# Patient Record
Sex: MALE | Race: OTHER | HISPANIC OR LATINO | Employment: UNEMPLOYED | URBAN - METROPOLITAN AREA
[De-identification: names, ages, dates, MRNs, and addresses within clinical notes are randomized per-mention and may not be internally consistent; named-entity substitution may affect disease eponyms.]

---

## 2017-01-03 ENCOUNTER — HOSPITAL ENCOUNTER (OUTPATIENT)
Dept: RADIOLOGY | Facility: HOSPITAL | Age: 52
Discharge: HOME | End: 2017-01-03
Payer: COMMERCIAL

## 2017-01-03 DIAGNOSIS — E78.00 PURE HYPERCHOLESTEROLEMIA: ICD-10-CM

## 2017-07-06 ENCOUNTER — HOSPITAL ENCOUNTER (EMERGENCY)
Facility: HOSPITAL | Age: 52
Discharge: HOME | End: 2017-07-06
Admitting: EMERGENCY MEDICINE
Payer: COMMERCIAL

## 2017-07-06 ENCOUNTER — APPOINTMENT (EMERGENCY)
Dept: CT IMAGING | Facility: HOSPITAL | Age: 52
End: 2017-07-06
Payer: COMMERCIAL

## 2017-07-06 VITALS
WEIGHT: 315 LBS | RESPIRATION RATE: 30 BRPM | TEMPERATURE: 99.1 F | SYSTOLIC BLOOD PRESSURE: 175 MMHG | DIASTOLIC BLOOD PRESSURE: 69 MMHG | OXYGEN SATURATION: 95 % | BODY MASS INDEX: 75.53 KG/M2 | HEART RATE: 80 BPM

## 2017-07-06 DIAGNOSIS — R91.1 PULMONARY NODULE: ICD-10-CM

## 2017-07-06 DIAGNOSIS — J02.9 ACUTE PHARYNGITIS: Primary | ICD-10-CM

## 2017-07-06 LAB
ALBUMIN SERPL BCP-MCNC: 3.4 G/DL (ref 3.5–5)
ALP SERPL-CCNC: 158 U/L (ref 46–116)
ALT SERPL W P-5'-P-CCNC: 128 U/L (ref 12–78)
ANION GAP SERPL CALCULATED.3IONS-SCNC: 12 MMOL/L (ref 4–13)
AST SERPL W P-5'-P-CCNC: 45 U/L (ref 5–45)
BASOPHILS # BLD AUTO: 0.05 THOUSANDS/ΜL (ref 0–0.1)
BASOPHILS NFR BLD AUTO: 0 % (ref 0–1)
BILIRUB SERPL-MCNC: 0.4 MG/DL (ref 0.2–1)
BUN SERPL-MCNC: 13 MG/DL (ref 5–25)
CALCIUM SERPL-MCNC: 10 MG/DL (ref 8.3–10.1)
CHLORIDE SERPL-SCNC: 97 MMOL/L (ref 100–108)
CO2 SERPL-SCNC: 24 MMOL/L (ref 21–32)
CREAT SERPL-MCNC: 1.15 MG/DL (ref 0.6–1.3)
DEPRECATED D DIMER PPP: <270 NG/ML (FEU) (ref 0–424)
EOSINOPHIL # BLD AUTO: 0.39 THOUSAND/ΜL (ref 0–0.61)
EOSINOPHIL NFR BLD AUTO: 4 % (ref 0–6)
ERYTHROCYTE [DISTWIDTH] IN BLOOD BY AUTOMATED COUNT: 14.9 % (ref 11.6–15.1)
GFR SERPL CREATININE-BSD FRML MDRD: >60 ML/MIN/1.73SQ M
GLUCOSE SERPL-MCNC: 361 MG/DL (ref 65–140)
HCT VFR BLD AUTO: 42.4 % (ref 36.5–49.3)
HGB BLD-MCNC: 14.3 G/DL (ref 12–17)
LYMPHOCYTES # BLD AUTO: 2.06 THOUSANDS/ΜL (ref 0.6–4.47)
LYMPHOCYTES NFR BLD AUTO: 18 % (ref 14–44)
MCH RBC QN AUTO: 30.4 PG (ref 26.8–34.3)
MCHC RBC AUTO-ENTMCNC: 33.7 G/DL (ref 31.4–37.4)
MCV RBC AUTO: 90 FL (ref 82–98)
MONOCYTES # BLD AUTO: 0.62 THOUSAND/ΜL (ref 0.17–1.22)
MONOCYTES NFR BLD AUTO: 6 % (ref 4–12)
NEUTROPHILS # BLD AUTO: 8.13 THOUSANDS/ΜL (ref 1.85–7.62)
NEUTS SEG NFR BLD AUTO: 72 % (ref 43–75)
PLATELET # BLD AUTO: 267 THOUSANDS/UL (ref 149–390)
PMV BLD AUTO: 10.3 FL (ref 8.9–12.7)
POTASSIUM SERPL-SCNC: 4.4 MMOL/L (ref 3.5–5.3)
PROT SERPL-MCNC: 7.9 G/DL (ref 6.4–8.2)
RBC # BLD AUTO: 4.71 MILLION/UL (ref 3.88–5.62)
S PYO AG THROAT QL: NEGATIVE
SODIUM SERPL-SCNC: 133 MMOL/L (ref 136–145)
WBC # BLD AUTO: 11.25 THOUSAND/UL (ref 4.31–10.16)

## 2017-07-06 PROCEDURE — 80053 COMPREHEN METABOLIC PANEL: CPT | Performed by: PHYSICIAN ASSISTANT

## 2017-07-06 PROCEDURE — 85379 FIBRIN DEGRADATION QUANT: CPT | Performed by: PHYSICIAN ASSISTANT

## 2017-07-06 PROCEDURE — 87147 CULTURE TYPE IMMUNOLOGIC: CPT | Performed by: PHYSICIAN ASSISTANT

## 2017-07-06 PROCEDURE — 87070 CULTURE OTHR SPECIMN AEROBIC: CPT | Performed by: PHYSICIAN ASSISTANT

## 2017-07-06 PROCEDURE — 99284 EMERGENCY DEPT VISIT MOD MDM: CPT

## 2017-07-06 PROCEDURE — 70491 CT SOFT TISSUE NECK W/DYE: CPT

## 2017-07-06 PROCEDURE — 87430 STREP A AG IA: CPT | Performed by: PHYSICIAN ASSISTANT

## 2017-07-06 PROCEDURE — 85025 COMPLETE CBC W/AUTO DIFF WBC: CPT | Performed by: PHYSICIAN ASSISTANT

## 2017-07-06 PROCEDURE — 36415 COLL VENOUS BLD VENIPUNCTURE: CPT | Performed by: PHYSICIAN ASSISTANT

## 2017-07-06 RX ADMIN — IOHEXOL 100 ML: 350 INJECTION, SOLUTION INTRAVENOUS at 16:14

## 2017-07-08 LAB — BACTERIA THROAT CULT: NORMAL

## 2017-07-14 ENCOUNTER — APPOINTMENT (EMERGENCY)
Dept: RADIOLOGY | Facility: HOSPITAL | Age: 52
End: 2017-07-14
Attending: EMERGENCY MEDICINE
Payer: COMMERCIAL

## 2017-07-14 ENCOUNTER — HOSPITAL ENCOUNTER (INPATIENT)
Facility: HOSPITAL | Age: 52
LOS: 3 days | Discharge: HOME | DRG: 113 | End: 2017-07-17
Attending: INTERNAL MEDICINE | Admitting: INTERNAL MEDICINE
Payer: COMMERCIAL

## 2017-07-14 ENCOUNTER — APPOINTMENT (EMERGENCY)
Dept: CT IMAGING | Facility: HOSPITAL | Age: 52
End: 2017-07-14
Payer: COMMERCIAL

## 2017-07-14 ENCOUNTER — HOSPITAL ENCOUNTER (EMERGENCY)
Facility: HOSPITAL | Age: 52
End: 2017-07-14
Attending: EMERGENCY MEDICINE
Payer: COMMERCIAL

## 2017-07-14 VITALS
RESPIRATION RATE: 25 BRPM | HEART RATE: 91 BPM | TEMPERATURE: 97.5 F | DIASTOLIC BLOOD PRESSURE: 65 MMHG | WEIGHT: 315 LBS | HEIGHT: 64 IN | SYSTOLIC BLOOD PRESSURE: 147 MMHG | OXYGEN SATURATION: 95 % | BODY MASS INDEX: 53.78 KG/M2

## 2017-07-14 DIAGNOSIS — E66.01 MORBID OBESITY (HCC): ICD-10-CM

## 2017-07-14 DIAGNOSIS — J36 PERITONSILLAR ABSCESS: Primary | ICD-10-CM

## 2017-07-14 PROBLEM — E87.1 HYPONATREMIA: Status: ACTIVE | Noted: 2017-07-14

## 2017-07-14 LAB
ANION GAP SERPL CALCULATED.3IONS-SCNC: 10 MMOL/L (ref 4–13)
BASOPHILS # BLD AUTO: 0.06 THOUSANDS/ΜL (ref 0–0.1)
BASOPHILS NFR BLD AUTO: 0 % (ref 0–1)
BUN SERPL-MCNC: 9 MG/DL (ref 5–25)
CALCIUM SERPL-MCNC: 9.7 MG/DL (ref 8.3–10.1)
CHLORIDE SERPL-SCNC: 96 MMOL/L (ref 100–108)
CO2 SERPL-SCNC: 24 MMOL/L (ref 21–32)
CREAT SERPL-MCNC: 1.1 MG/DL (ref 0.6–1.3)
EOSINOPHIL # BLD AUTO: 0.32 THOUSAND/ΜL (ref 0–0.61)
EOSINOPHIL NFR BLD AUTO: 2 % (ref 0–6)
ERYTHROCYTE [DISTWIDTH] IN BLOOD BY AUTOMATED COUNT: 14.9 % (ref 11.6–15.1)
GFR SERPL CREATININE-BSD FRML MDRD: >60 ML/MIN/1.73SQ M
GLUCOSE SERPL-MCNC: 255 MG/DL (ref 65–140)
GLUCOSE SERPL-MCNC: 276 MG/DL (ref 65–140)
HCT VFR BLD AUTO: 44.2 % (ref 36.5–49.3)
HGB BLD-MCNC: 15.2 G/DL (ref 12–17)
LYMPHOCYTES # BLD AUTO: 2.32 THOUSANDS/ΜL (ref 0.6–4.47)
LYMPHOCYTES NFR BLD AUTO: 15 % (ref 14–44)
MCH RBC QN AUTO: 30.3 PG (ref 26.8–34.3)
MCHC RBC AUTO-ENTMCNC: 34.4 G/DL (ref 31.4–37.4)
MCV RBC AUTO: 88 FL (ref 82–98)
MONOCYTES # BLD AUTO: 0.84 THOUSAND/ΜL (ref 0.17–1.22)
MONOCYTES NFR BLD AUTO: 5 % (ref 4–12)
NEUTROPHILS # BLD AUTO: 11.96 THOUSANDS/ΜL (ref 1.85–7.62)
NEUTS SEG NFR BLD AUTO: 78 % (ref 43–75)
NT-PROBNP SERPL-MCNC: <5 PG/ML
PLATELET # BLD AUTO: 288 THOUSANDS/UL (ref 149–390)
PMV BLD AUTO: 9.9 FL (ref 8.9–12.7)
POTASSIUM SERPL-SCNC: 4.1 MMOL/L (ref 3.5–5.3)
RBC # BLD AUTO: 5.02 MILLION/UL (ref 3.88–5.62)
SODIUM SERPL-SCNC: 130 MMOL/L (ref 136–145)
TROPONIN I SERPL-MCNC: <0.02 NG/ML
WBC # BLD AUTO: 15.5 THOUSAND/UL (ref 4.31–10.16)

## 2017-07-14 PROCEDURE — 96361 HYDRATE IV INFUSION ADD-ON: CPT

## 2017-07-14 PROCEDURE — 99285 EMERGENCY DEPT VISIT HI MDM: CPT

## 2017-07-14 PROCEDURE — 82948 REAGENT STRIP/BLOOD GLUCOSE: CPT

## 2017-07-14 PROCEDURE — 96365 THER/PROPH/DIAG IV INF INIT: CPT

## 2017-07-14 PROCEDURE — 80048 BASIC METABOLIC PNL TOTAL CA: CPT | Performed by: EMERGENCY MEDICINE

## 2017-07-14 PROCEDURE — 85025 COMPLETE CBC W/AUTO DIFF WBC: CPT | Performed by: EMERGENCY MEDICINE

## 2017-07-14 PROCEDURE — 93005 ELECTROCARDIOGRAM TRACING: CPT | Performed by: EMERGENCY MEDICINE

## 2017-07-14 PROCEDURE — 83880 ASSAY OF NATRIURETIC PEPTIDE: CPT | Performed by: EMERGENCY MEDICINE

## 2017-07-14 PROCEDURE — 36415 COLL VENOUS BLD VENIPUNCTURE: CPT | Performed by: EMERGENCY MEDICINE

## 2017-07-14 PROCEDURE — 71020 HB CHEST X-RAY 2VW FRONTAL&LATL: CPT

## 2017-07-14 PROCEDURE — 70491 CT SOFT TISSUE NECK W/DYE: CPT

## 2017-07-14 PROCEDURE — 84484 ASSAY OF TROPONIN QUANT: CPT | Performed by: EMERGENCY MEDICINE

## 2017-07-14 PROCEDURE — 96375 TX/PRO/DX INJ NEW DRUG ADDON: CPT

## 2017-07-14 RX ORDER — DOXAZOSIN MESYLATE 4 MG/1
4 TABLET ORAL
Status: DISCONTINUED | OUTPATIENT
Start: 2017-07-14 | End: 2017-07-17 | Stop reason: HOSPADM

## 2017-07-14 RX ORDER — MORPHINE SULFATE 10 MG/ML
6 INJECTION, SOLUTION INTRAMUSCULAR; INTRAVENOUS EVERY 4 HOURS PRN
Status: DISCONTINUED | OUTPATIENT
Start: 2017-07-14 | End: 2017-07-17 | Stop reason: HOSPADM

## 2017-07-14 RX ORDER — ASPIRIN 81 MG/1
81 TABLET ORAL DAILY
Status: DISCONTINUED | OUTPATIENT
Start: 2017-07-15 | End: 2017-07-17 | Stop reason: HOSPADM

## 2017-07-14 RX ORDER — BUDESONIDE AND FORMOTEROL FUMARATE DIHYDRATE 160; 4.5 UG/1; UG/1
2 AEROSOL RESPIRATORY (INHALATION) 2 TIMES DAILY
Status: DISCONTINUED | OUTPATIENT
Start: 2017-07-15 | End: 2017-07-17 | Stop reason: HOSPADM

## 2017-07-14 RX ORDER — LIDOCAINE 50 MG/G
1 PATCH TOPICAL EVERY 24 HOURS
Status: DISCONTINUED | OUTPATIENT
Start: 2017-07-14 | End: 2017-07-17 | Stop reason: HOSPADM

## 2017-07-14 RX ORDER — ALBUTEROL SULFATE 2.5 MG/3ML
2.5 SOLUTION RESPIRATORY (INHALATION) 3 TIMES DAILY
Status: DISCONTINUED | OUTPATIENT
Start: 2017-07-14 | End: 2017-07-17 | Stop reason: HOSPADM

## 2017-07-14 RX ORDER — ATENOLOL 50 MG/1
50 TABLET ORAL DAILY
Status: DISCONTINUED | OUTPATIENT
Start: 2017-07-15 | End: 2017-07-17 | Stop reason: HOSPADM

## 2017-07-14 RX ORDER — INSULIN GLARGINE 100 [IU]/ML
20 INJECTION, SOLUTION SUBCUTANEOUS DAILY
Status: DISCONTINUED | OUTPATIENT
Start: 2017-07-15 | End: 2017-07-16

## 2017-07-14 RX ORDER — ACETAMINOPHEN 325 MG/1
650 TABLET ORAL EVERY 4 HOURS PRN
Status: DISCONTINUED | OUTPATIENT
Start: 2017-07-14 | End: 2017-07-17 | Stop reason: HOSPADM

## 2017-07-14 RX ORDER — HALOPERIDOL 5 MG
10 TABLET ORAL DAILY
Status: DISCONTINUED | OUTPATIENT
Start: 2017-07-15 | End: 2017-07-17 | Stop reason: HOSPADM

## 2017-07-14 RX ORDER — CLINDAMYCIN PHOSPHATE 600 MG/50ML
600 INJECTION INTRAVENOUS ONCE
Status: COMPLETED | OUTPATIENT
Start: 2017-07-14 | End: 2017-07-14

## 2017-07-14 RX ORDER — BENZTROPINE MESYLATE 1 MG/1
1 TABLET ORAL DAILY
Status: DISCONTINUED | OUTPATIENT
Start: 2017-07-15 | End: 2017-07-17 | Stop reason: HOSPADM

## 2017-07-14 RX ORDER — FLUTICASONE PROPIONATE 50 MCG
1 SPRAY, SUSPENSION (ML) NASAL 2 TIMES DAILY
Status: DISCONTINUED | OUTPATIENT
Start: 2017-07-15 | End: 2017-07-17 | Stop reason: HOSPADM

## 2017-07-14 RX ORDER — FUROSEMIDE 40 MG/1
40 TABLET ORAL 2 TIMES DAILY
Status: DISCONTINUED | OUTPATIENT
Start: 2017-07-15 | End: 2017-07-17 | Stop reason: HOSPADM

## 2017-07-14 RX ORDER — CLINDAMYCIN PHOSPHATE 600 MG/50ML
600 INJECTION INTRAVENOUS EVERY 8 HOURS
Status: DISCONTINUED | OUTPATIENT
Start: 2017-07-14 | End: 2017-07-17 | Stop reason: HOSPADM

## 2017-07-14 RX ORDER — LOSARTAN POTASSIUM 50 MG/1
50 TABLET ORAL 2 TIMES DAILY
Status: DISCONTINUED | OUTPATIENT
Start: 2017-07-15 | End: 2017-07-17 | Stop reason: HOSPADM

## 2017-07-14 RX ORDER — PANTOPRAZOLE SODIUM 40 MG/1
40 TABLET, DELAYED RELEASE ORAL
Status: DISCONTINUED | OUTPATIENT
Start: 2017-07-15 | End: 2017-07-17 | Stop reason: HOSPADM

## 2017-07-14 RX ORDER — ONDANSETRON 2 MG/ML
4 INJECTION INTRAMUSCULAR; INTRAVENOUS EVERY 6 HOURS PRN
Status: DISCONTINUED | OUTPATIENT
Start: 2017-07-14 | End: 2017-07-17 | Stop reason: HOSPADM

## 2017-07-14 RX ORDER — CLINDAMYCIN PHOSPHATE 600 MG/50ML
600 INJECTION INTRAVENOUS ONCE
Status: DISCONTINUED | OUTPATIENT
Start: 2017-07-14 | End: 2017-07-14

## 2017-07-14 RX ORDER — ALPRAZOLAM 0.5 MG/1
1 TABLET ORAL DAILY
Status: DISCONTINUED | OUTPATIENT
Start: 2017-07-15 | End: 2017-07-17 | Stop reason: HOSPADM

## 2017-07-14 RX ORDER — ACETAMINOPHEN 325 MG/1
975 TABLET ORAL ONCE
Status: COMPLETED | OUTPATIENT
Start: 2017-07-14 | End: 2017-07-14

## 2017-07-14 RX ORDER — FLUOXETINE HYDROCHLORIDE 20 MG/1
40 CAPSULE ORAL DAILY
Status: DISCONTINUED | OUTPATIENT
Start: 2017-07-15 | End: 2017-07-17 | Stop reason: HOSPADM

## 2017-07-14 RX ORDER — DEXAMETHASONE SODIUM PHOSPHATE 4 MG/ML
4 INJECTION, SOLUTION INTRA-ARTICULAR; INTRALESIONAL; INTRAMUSCULAR; INTRAVENOUS; SOFT TISSUE EVERY 6 HOURS
Status: DISCONTINUED | OUTPATIENT
Start: 2017-07-14 | End: 2017-07-17 | Stop reason: HOSPADM

## 2017-07-14 RX ORDER — NICOTINE 21 MG/24HR
1 PATCH, TRANSDERMAL 24 HOURS TRANSDERMAL DAILY PRN
Status: DISCONTINUED | OUTPATIENT
Start: 2017-07-14 | End: 2017-07-17 | Stop reason: HOSPADM

## 2017-07-14 RX ORDER — OXYCODONE HYDROCHLORIDE AND ACETAMINOPHEN 5; 325 MG/1; MG/1
1 TABLET ORAL EVERY 4 HOURS PRN
Status: DISCONTINUED | OUTPATIENT
Start: 2017-07-14 | End: 2017-07-17 | Stop reason: HOSPADM

## 2017-07-14 RX ORDER — OXYCODONE HYDROCHLORIDE AND ACETAMINOPHEN 5; 325 MG/1; MG/1
2 TABLET ORAL EVERY 4 HOURS PRN
Status: DISCONTINUED | OUTPATIENT
Start: 2017-07-14 | End: 2017-07-17 | Stop reason: HOSPADM

## 2017-07-14 RX ADMIN — SODIUM CHLORIDE 1000 ML: 0.9 INJECTION, SOLUTION INTRAVENOUS at 09:17

## 2017-07-14 RX ADMIN — IOHEXOL 85 ML: 350 INJECTION, SOLUTION INTRAVENOUS at 10:12

## 2017-07-14 RX ADMIN — DEXAMETHASONE SODIUM PHOSPHATE 10 MG: 10 INJECTION, SOLUTION INTRAMUSCULAR; INTRAVENOUS at 09:20

## 2017-07-14 RX ADMIN — ACETAMINOPHEN 975 MG: 325 TABLET ORAL at 15:59

## 2017-07-14 RX ADMIN — CLINDAMYCIN PHOSPHATE 600 MG: 12 INJECTION, SOLUTION INTRAVENOUS at 10:53

## 2017-07-15 LAB
ALBUMIN SERPL BCP-MCNC: 3.3 G/DL (ref 3.5–5)
ALP SERPL-CCNC: 147 U/L (ref 46–116)
ALT SERPL W P-5'-P-CCNC: 74 U/L (ref 12–78)
ANION GAP SERPL CALCULATED.3IONS-SCNC: 10 MMOL/L (ref 4–13)
AST SERPL W P-5'-P-CCNC: 33 U/L (ref 5–45)
ATRIAL RATE: 94 BPM
BASOPHILS # BLD AUTO: 0.02 THOUSANDS/ΜL (ref 0–0.1)
BASOPHILS NFR BLD AUTO: 0 % (ref 0–1)
BILIRUB SERPL-MCNC: 0.9 MG/DL (ref 0.2–1)
BUN SERPL-MCNC: 13 MG/DL (ref 5–25)
CALCIUM SERPL-MCNC: 9.2 MG/DL (ref 8.3–10.1)
CHLORIDE SERPL-SCNC: 100 MMOL/L (ref 100–108)
CO2 SERPL-SCNC: 22 MMOL/L (ref 21–32)
CORTIS SERPL-MCNC: 0.6 UG/DL
CREAT SERPL-MCNC: 0.86 MG/DL (ref 0.6–1.3)
EOSINOPHIL # BLD AUTO: 0 THOUSAND/ΜL (ref 0–0.61)
EOSINOPHIL NFR BLD AUTO: 0 % (ref 0–6)
ERYTHROCYTE [DISTWIDTH] IN BLOOD BY AUTOMATED COUNT: 14.5 % (ref 11.6–15.1)
EST. AVERAGE GLUCOSE BLD GHB EST-MCNC: 183 MG/DL
GFR SERPL CREATININE-BSD FRML MDRD: >60 ML/MIN/1.73SQ M
GLUCOSE SERPL-MCNC: 282 MG/DL (ref 65–140)
GLUCOSE SERPL-MCNC: 295 MG/DL (ref 65–140)
GLUCOSE SERPL-MCNC: 300 MG/DL (ref 65–140)
GLUCOSE SERPL-MCNC: 380 MG/DL (ref 65–140)
GLUCOSE SERPL-MCNC: 393 MG/DL (ref 65–140)
HBA1C MFR BLD: 8 % (ref 4.2–6.3)
HCT VFR BLD AUTO: 40.5 % (ref 36.5–49.3)
HGB BLD-MCNC: 14.2 G/DL (ref 12–17)
LYMPHOCYTES # BLD AUTO: 1.71 THOUSANDS/ΜL (ref 0.6–4.47)
LYMPHOCYTES NFR BLD AUTO: 11 % (ref 14–44)
MCH RBC QN AUTO: 31.1 PG (ref 26.8–34.3)
MCHC RBC AUTO-ENTMCNC: 35.1 G/DL (ref 31.4–37.4)
MCV RBC AUTO: 89 FL (ref 82–98)
MONOCYTES # BLD AUTO: 0.41 THOUSAND/ΜL (ref 0.17–1.22)
MONOCYTES NFR BLD AUTO: 3 % (ref 4–12)
NEUTROPHILS # BLD AUTO: 13.48 THOUSANDS/ΜL (ref 1.85–7.62)
NEUTS SEG NFR BLD AUTO: 86 % (ref 43–75)
NRBC BLD AUTO-RTO: 0 /100 WBCS
OSMOLALITY UR/SERPL-RTO: 300 MMOL/KG (ref 282–298)
OSMOLALITY UR: 666 MMOL/KG
P AXIS: 59 DEGREES
PLATELET # BLD AUTO: 284 THOUSANDS/UL (ref 149–390)
PMV BLD AUTO: 10.4 FL (ref 8.9–12.7)
POTASSIUM SERPL-SCNC: 4.3 MMOL/L (ref 3.5–5.3)
PR INTERVAL: 144 MS
PROT SERPL-MCNC: 8.1 G/DL (ref 6.4–8.2)
QRS AXIS: 15 DEGREES
QRSD INTERVAL: 82 MS
QT INTERVAL: 320 MS
QTC INTERVAL: 400 MS
RBC # BLD AUTO: 4.57 MILLION/UL (ref 3.88–5.62)
SODIUM 24H UR-SCNC: 38 MOL/L
SODIUM SERPL-SCNC: 132 MMOL/L (ref 136–145)
T WAVE AXIS: 73 DEGREES
TSH SERPL DL<=0.05 MIU/L-ACNC: 0.36 UIU/ML (ref 0.36–3.74)
VENTRICULAR RATE: 94 BPM
WBC # BLD AUTO: 15.72 THOUSAND/UL (ref 4.31–10.16)

## 2017-07-15 PROCEDURE — 85025 COMPLETE CBC W/AUTO DIFF WBC: CPT | Performed by: INTERNAL MEDICINE

## 2017-07-15 PROCEDURE — 82533 TOTAL CORTISOL: CPT | Performed by: INTERNAL MEDICINE

## 2017-07-15 PROCEDURE — 94660 CPAP INITIATION&MGMT: CPT

## 2017-07-15 PROCEDURE — 83930 ASSAY OF BLOOD OSMOLALITY: CPT | Performed by: INTERNAL MEDICINE

## 2017-07-15 PROCEDURE — 80053 COMPREHEN METABOLIC PANEL: CPT | Performed by: INTERNAL MEDICINE

## 2017-07-15 PROCEDURE — 94640 AIRWAY INHALATION TREATMENT: CPT

## 2017-07-15 PROCEDURE — 84443 ASSAY THYROID STIM HORMONE: CPT | Performed by: INTERNAL MEDICINE

## 2017-07-15 PROCEDURE — 82948 REAGENT STRIP/BLOOD GLUCOSE: CPT

## 2017-07-15 PROCEDURE — 83036 HEMOGLOBIN GLYCOSYLATED A1C: CPT | Performed by: INTERNAL MEDICINE

## 2017-07-15 PROCEDURE — 83935 ASSAY OF URINE OSMOLALITY: CPT | Performed by: INTERNAL MEDICINE

## 2017-07-15 PROCEDURE — 84300 ASSAY OF URINE SODIUM: CPT | Performed by: INTERNAL MEDICINE

## 2017-07-15 PROCEDURE — 94760 N-INVAS EAR/PLS OXIMETRY 1: CPT

## 2017-07-15 RX ADMIN — OXYCODONE HYDROCHLORIDE AND ACETAMINOPHEN 2 TABLET: 5; 325 TABLET ORAL at 20:23

## 2017-07-15 RX ADMIN — INSULIN LISPRO 5 UNITS: 100 INJECTION, SOLUTION INTRAVENOUS; SUBCUTANEOUS at 22:10

## 2017-07-15 RX ADMIN — DEXAMETHASONE SODIUM PHOSPHATE 4 MG: 4 INJECTION, SOLUTION INTRAMUSCULAR; INTRAVENOUS at 22:10

## 2017-07-15 RX ADMIN — ALBUTEROL SULFATE 2.5 MG: 2.5 SOLUTION RESPIRATORY (INHALATION) at 20:30

## 2017-07-15 RX ADMIN — DOXAZOSIN 4 MG: 4 TABLET ORAL at 22:11

## 2017-07-15 RX ADMIN — LOSARTAN POTASSIUM 50 MG: 50 TABLET, FILM COATED ORAL at 16:59

## 2017-07-15 RX ADMIN — INSULIN LISPRO 15 UNITS: 100 INJECTION, SOLUTION INTRAVENOUS; SUBCUTANEOUS at 13:55

## 2017-07-15 RX ADMIN — INSULIN GLARGINE 20 UNITS: 100 INJECTION, SOLUTION SUBCUTANEOUS at 09:36

## 2017-07-15 RX ADMIN — INSULIN LISPRO 4 UNITS: 100 INJECTION, SOLUTION INTRAVENOUS; SUBCUTANEOUS at 00:06

## 2017-07-15 RX ADMIN — DOXAZOSIN 4 MG: 4 TABLET ORAL at 00:33

## 2017-07-15 RX ADMIN — OXYCODONE HYDROCHLORIDE AND ACETAMINOPHEN 2 TABLET: 5; 325 TABLET ORAL at 09:45

## 2017-07-15 RX ADMIN — FLUTICASONE PROPIONATE 1 SPRAY: 50 SPRAY, METERED NASAL at 17:01

## 2017-07-15 RX ADMIN — BUDESONIDE AND FORMOTEROL FUMARATE DIHYDRATE 2 PUFF: 160; 4.5 AEROSOL RESPIRATORY (INHALATION) at 09:49

## 2017-07-15 RX ADMIN — CLINDAMYCIN PHOSPHATE 600 MG: 12 INJECTION, SOLUTION INTRAMUSCULAR; INTRAVENOUS at 13:54

## 2017-07-15 RX ADMIN — FLUOXETINE 40 MG: 20 CAPSULE ORAL at 09:37

## 2017-07-15 RX ADMIN — DEXAMETHASONE SODIUM PHOSPHATE 4 MG: 4 INJECTION, SOLUTION INTRAMUSCULAR; INTRAVENOUS at 16:59

## 2017-07-15 RX ADMIN — BUDESONIDE AND FORMOTEROL FUMARATE DIHYDRATE 2 PUFF: 160; 4.5 AEROSOL RESPIRATORY (INHALATION) at 17:01

## 2017-07-15 RX ADMIN — ALBUTEROL SULFATE 2.5 MG: 2.5 SOLUTION RESPIRATORY (INHALATION) at 10:10

## 2017-07-15 RX ADMIN — CLINDAMYCIN PHOSPHATE 600 MG: 12 INJECTION, SOLUTION INTRAMUSCULAR; INTRAVENOUS at 00:05

## 2017-07-15 RX ADMIN — FUROSEMIDE 40 MG: 40 TABLET ORAL at 16:59

## 2017-07-15 RX ADMIN — OXYCODONE HYDROCHLORIDE AND ACETAMINOPHEN 2 TABLET: 5; 325 TABLET ORAL at 00:09

## 2017-07-15 RX ADMIN — ALBUTEROL SULFATE 2.5 MG: 2.5 SOLUTION RESPIRATORY (INHALATION) at 15:49

## 2017-07-15 RX ADMIN — INSULIN LISPRO 15 UNITS: 100 INJECTION, SOLUTION INTRAVENOUS; SUBCUTANEOUS at 17:30

## 2017-07-15 RX ADMIN — BENZTROPINE MESYLATE 1 MG: 1 TABLET ORAL at 09:39

## 2017-07-15 RX ADMIN — LOSARTAN POTASSIUM 50 MG: 50 TABLET, FILM COATED ORAL at 09:37

## 2017-07-15 RX ADMIN — DEXAMETHASONE SODIUM PHOSPHATE 4 MG: 4 INJECTION, SOLUTION INTRAMUSCULAR; INTRAVENOUS at 00:05

## 2017-07-15 RX ADMIN — ALPRAZOLAM 1 MG: 0.5 TABLET ORAL at 09:37

## 2017-07-15 RX ADMIN — DEXAMETHASONE SODIUM PHOSPHATE 4 MG: 4 INJECTION, SOLUTION INTRAMUSCULAR; INTRAVENOUS at 09:36

## 2017-07-15 RX ADMIN — INSULIN LISPRO 15 UNITS: 100 INJECTION, SOLUTION INTRAVENOUS; SUBCUTANEOUS at 09:39

## 2017-07-15 RX ADMIN — DEXAMETHASONE SODIUM PHOSPHATE 4 MG: 4 INJECTION, SOLUTION INTRAMUSCULAR; INTRAVENOUS at 05:53

## 2017-07-15 RX ADMIN — ATENOLOL 50 MG: 50 TABLET ORAL at 09:36

## 2017-07-15 RX ADMIN — CLINDAMYCIN PHOSPHATE 600 MG: 12 INJECTION, SOLUTION INTRAMUSCULAR; INTRAVENOUS at 05:57

## 2017-07-15 RX ADMIN — ASPIRIN 81 MG: 81 TABLET, COATED ORAL at 09:37

## 2017-07-15 RX ADMIN — PANTOPRAZOLE SODIUM 40 MG: 40 TABLET, DELAYED RELEASE ORAL at 16:59

## 2017-07-15 RX ADMIN — FUROSEMIDE 40 MG: 40 TABLET ORAL at 09:37

## 2017-07-15 RX ADMIN — INSULIN LISPRO 12 UNITS: 100 INJECTION, SOLUTION INTRAVENOUS; SUBCUTANEOUS at 09:39

## 2017-07-15 RX ADMIN — INSULIN LISPRO 16 UNITS: 100 INJECTION, SOLUTION INTRAVENOUS; SUBCUTANEOUS at 17:30

## 2017-07-15 RX ADMIN — CLINDAMYCIN PHOSPHATE 600 MG: 12 INJECTION, SOLUTION INTRAMUSCULAR; INTRAVENOUS at 22:11

## 2017-07-15 RX ADMIN — INSULIN LISPRO 12 UNITS: 100 INJECTION, SOLUTION INTRAVENOUS; SUBCUTANEOUS at 13:55

## 2017-07-15 RX ADMIN — PANTOPRAZOLE SODIUM 40 MG: 40 TABLET, DELAYED RELEASE ORAL at 05:53

## 2017-07-15 RX ADMIN — ENOXAPARIN SODIUM 40 MG: 40 INJECTION SUBCUTANEOUS at 09:36

## 2017-07-15 RX ADMIN — HALOPERIDOL 10 MG: 5 TABLET ORAL at 09:36

## 2017-07-15 RX ADMIN — FLUTICASONE PROPIONATE 1 SPRAY: 50 SPRAY, METERED NASAL at 09:39

## 2017-07-16 LAB
ANION GAP SERPL CALCULATED.3IONS-SCNC: 8 MMOL/L (ref 4–13)
BASOPHILS # BLD AUTO: 0.01 THOUSANDS/ΜL (ref 0–0.1)
BASOPHILS NFR BLD AUTO: 0 % (ref 0–1)
BUN SERPL-MCNC: 19 MG/DL (ref 5–25)
CALCIUM SERPL-MCNC: 9 MG/DL (ref 8.3–10.1)
CHLORIDE SERPL-SCNC: 100 MMOL/L (ref 100–108)
CO2 SERPL-SCNC: 23 MMOL/L (ref 21–32)
CREAT SERPL-MCNC: 1.04 MG/DL (ref 0.6–1.3)
EOSINOPHIL # BLD AUTO: 0.01 THOUSAND/ΜL (ref 0–0.61)
EOSINOPHIL NFR BLD AUTO: 0 % (ref 0–6)
ERYTHROCYTE [DISTWIDTH] IN BLOOD BY AUTOMATED COUNT: 14.8 % (ref 11.6–15.1)
GFR SERPL CREATININE-BSD FRML MDRD: >60 ML/MIN/1.73SQ M
GLUCOSE SERPL-MCNC: 273 MG/DL (ref 65–140)
GLUCOSE SERPL-MCNC: 322 MG/DL (ref 65–140)
GLUCOSE SERPL-MCNC: 335 MG/DL (ref 65–140)
GLUCOSE SERPL-MCNC: 341 MG/DL (ref 65–140)
GLUCOSE SERPL-MCNC: 348 MG/DL (ref 65–140)
HCT VFR BLD AUTO: 41.4 % (ref 36.5–49.3)
HGB BLD-MCNC: 14.4 G/DL (ref 12–17)
LYMPHOCYTES # BLD AUTO: 1.97 THOUSANDS/ΜL (ref 0.6–4.47)
LYMPHOCYTES NFR BLD AUTO: 12 % (ref 14–44)
MCH RBC QN AUTO: 31 PG (ref 26.8–34.3)
MCHC RBC AUTO-ENTMCNC: 34.8 G/DL (ref 31.4–37.4)
MCV RBC AUTO: 89 FL (ref 82–98)
MONOCYTES # BLD AUTO: 0.48 THOUSAND/ΜL (ref 0.17–1.22)
MONOCYTES NFR BLD AUTO: 3 % (ref 4–12)
NEUTROPHILS # BLD AUTO: 13.28 THOUSANDS/ΜL (ref 1.85–7.62)
NEUTS SEG NFR BLD AUTO: 85 % (ref 43–75)
NRBC BLD AUTO-RTO: 0 /100 WBCS
PLATELET # BLD AUTO: 302 THOUSANDS/UL (ref 149–390)
PMV BLD AUTO: 10.5 FL (ref 8.9–12.7)
POTASSIUM SERPL-SCNC: 4.2 MMOL/L (ref 3.5–5.3)
RBC # BLD AUTO: 4.64 MILLION/UL (ref 3.88–5.62)
SODIUM SERPL-SCNC: 131 MMOL/L (ref 136–145)
WBC # BLD AUTO: 15.83 THOUSAND/UL (ref 4.31–10.16)

## 2017-07-16 PROCEDURE — 94640 AIRWAY INHALATION TREATMENT: CPT

## 2017-07-16 PROCEDURE — 85025 COMPLETE CBC W/AUTO DIFF WBC: CPT | Performed by: INTERNAL MEDICINE

## 2017-07-16 PROCEDURE — 82948 REAGENT STRIP/BLOOD GLUCOSE: CPT

## 2017-07-16 PROCEDURE — 80048 BASIC METABOLIC PNL TOTAL CA: CPT | Performed by: INTERNAL MEDICINE

## 2017-07-16 PROCEDURE — 94760 N-INVAS EAR/PLS OXIMETRY 1: CPT

## 2017-07-16 RX ORDER — INSULIN GLARGINE 100 [IU]/ML
40 INJECTION, SOLUTION SUBCUTANEOUS DAILY
Status: DISCONTINUED | OUTPATIENT
Start: 2017-07-17 | End: 2017-07-17 | Stop reason: HOSPADM

## 2017-07-16 RX ADMIN — ALBUTEROL SULFATE 2.5 MG: 2.5 SOLUTION RESPIRATORY (INHALATION) at 13:15

## 2017-07-16 RX ADMIN — CLINDAMYCIN PHOSPHATE 600 MG: 12 INJECTION, SOLUTION INTRAMUSCULAR; INTRAVENOUS at 23:01

## 2017-07-16 RX ADMIN — CLINDAMYCIN PHOSPHATE 600 MG: 12 INJECTION, SOLUTION INTRAMUSCULAR; INTRAVENOUS at 05:55

## 2017-07-16 RX ADMIN — DOXAZOSIN 4 MG: 4 TABLET ORAL at 23:06

## 2017-07-16 RX ADMIN — FUROSEMIDE 40 MG: 40 TABLET ORAL at 18:17

## 2017-07-16 RX ADMIN — ATENOLOL 50 MG: 50 TABLET ORAL at 08:10

## 2017-07-16 RX ADMIN — DEXAMETHASONE SODIUM PHOSPHATE 4 MG: 4 INJECTION, SOLUTION INTRAMUSCULAR; INTRAVENOUS at 23:01

## 2017-07-16 RX ADMIN — ENOXAPARIN SODIUM 40 MG: 40 INJECTION SUBCUTANEOUS at 08:10

## 2017-07-16 RX ADMIN — OXYCODONE HYDROCHLORIDE AND ACETAMINOPHEN 1 TABLET: 5; 325 TABLET ORAL at 20:06

## 2017-07-16 RX ADMIN — PANTOPRAZOLE SODIUM 40 MG: 40 TABLET, DELAYED RELEASE ORAL at 05:54

## 2017-07-16 RX ADMIN — ALBUTEROL SULFATE 2.5 MG: 2.5 SOLUTION RESPIRATORY (INHALATION) at 07:30

## 2017-07-16 RX ADMIN — FLUTICASONE PROPIONATE 1 SPRAY: 50 SPRAY, METERED NASAL at 18:17

## 2017-07-16 RX ADMIN — PANTOPRAZOLE SODIUM 40 MG: 40 TABLET, DELAYED RELEASE ORAL at 18:17

## 2017-07-16 RX ADMIN — INSULIN LISPRO 16 UNITS: 100 INJECTION, SOLUTION INTRAVENOUS; SUBCUTANEOUS at 14:32

## 2017-07-16 RX ADMIN — DEXAMETHASONE SODIUM PHOSPHATE 4 MG: 4 INJECTION, SOLUTION INTRAMUSCULAR; INTRAVENOUS at 18:17

## 2017-07-16 RX ADMIN — LOSARTAN POTASSIUM 50 MG: 50 TABLET, FILM COATED ORAL at 18:17

## 2017-07-16 RX ADMIN — FUROSEMIDE 40 MG: 40 TABLET ORAL at 08:10

## 2017-07-16 RX ADMIN — DEXAMETHASONE SODIUM PHOSPHATE 4 MG: 4 INJECTION, SOLUTION INTRAMUSCULAR; INTRAVENOUS at 14:32

## 2017-07-16 RX ADMIN — FLUTICASONE PROPIONATE 1 SPRAY: 50 SPRAY, METERED NASAL at 08:09

## 2017-07-16 RX ADMIN — FLUOXETINE 40 MG: 20 CAPSULE ORAL at 08:10

## 2017-07-16 RX ADMIN — BUDESONIDE AND FORMOTEROL FUMARATE DIHYDRATE 2 PUFF: 160; 4.5 AEROSOL RESPIRATORY (INHALATION) at 18:18

## 2017-07-16 RX ADMIN — ASPIRIN 81 MG: 81 TABLET, COATED ORAL at 08:11

## 2017-07-16 RX ADMIN — BENZTROPINE MESYLATE 1 MG: 1 TABLET ORAL at 08:10

## 2017-07-16 RX ADMIN — INSULIN LISPRO 12 UNITS: 100 INJECTION, SOLUTION INTRAVENOUS; SUBCUTANEOUS at 18:19

## 2017-07-16 RX ADMIN — HALOPERIDOL 10 MG: 5 TABLET ORAL at 08:11

## 2017-07-16 RX ADMIN — INSULIN LISPRO 15 UNITS: 100 INJECTION, SOLUTION INTRAVENOUS; SUBCUTANEOUS at 08:13

## 2017-07-16 RX ADMIN — INSULIN GLARGINE 20 UNITS: 100 INJECTION, SOLUTION SUBCUTANEOUS at 08:10

## 2017-07-16 RX ADMIN — DEXAMETHASONE SODIUM PHOSPHATE 4 MG: 4 INJECTION, SOLUTION INTRAMUSCULAR; INTRAVENOUS at 05:54

## 2017-07-16 RX ADMIN — BUDESONIDE AND FORMOTEROL FUMARATE DIHYDRATE 2 PUFF: 160; 4.5 AEROSOL RESPIRATORY (INHALATION) at 08:09

## 2017-07-16 RX ADMIN — LOSARTAN POTASSIUM 50 MG: 50 TABLET, FILM COATED ORAL at 08:11

## 2017-07-16 RX ADMIN — ALPRAZOLAM 1 MG: 0.5 TABLET ORAL at 08:11

## 2017-07-16 RX ADMIN — INSULIN LISPRO 16 UNITS: 100 INJECTION, SOLUTION INTRAVENOUS; SUBCUTANEOUS at 08:12

## 2017-07-16 RX ADMIN — INSULIN LISPRO 5 UNITS: 100 INJECTION, SOLUTION INTRAVENOUS; SUBCUTANEOUS at 23:01

## 2017-07-16 RX ADMIN — CLINDAMYCIN PHOSPHATE 600 MG: 12 INJECTION, SOLUTION INTRAMUSCULAR; INTRAVENOUS at 14:32

## 2017-07-16 RX ADMIN — ALBUTEROL SULFATE 2.5 MG: 2.5 SOLUTION RESPIRATORY (INHALATION) at 19:39

## 2017-07-17 VITALS
TEMPERATURE: 97.9 F | HEART RATE: 67 BPM | BODY MASS INDEX: 53.78 KG/M2 | OXYGEN SATURATION: 98 % | SYSTOLIC BLOOD PRESSURE: 169 MMHG | WEIGHT: 315 LBS | HEIGHT: 64 IN | DIASTOLIC BLOOD PRESSURE: 81 MMHG | RESPIRATION RATE: 20 BRPM

## 2017-07-17 LAB
ALBUMIN SERPL BCP-MCNC: 3.1 G/DL (ref 3.5–5)
ALP SERPL-CCNC: 129 U/L (ref 46–116)
ALT SERPL W P-5'-P-CCNC: 53 U/L (ref 12–78)
ANION GAP SERPL CALCULATED.3IONS-SCNC: 9 MMOL/L (ref 4–13)
AST SERPL W P-5'-P-CCNC: 29 U/L (ref 5–45)
BASOPHILS # BLD MANUAL: 0 THOUSAND/UL (ref 0–0.1)
BASOPHILS NFR MAR MANUAL: 0 % (ref 0–1)
BILIRUB SERPL-MCNC: 0.53 MG/DL (ref 0.2–1)
BUN SERPL-MCNC: 20 MG/DL (ref 5–25)
CALCIUM SERPL-MCNC: 8.7 MG/DL (ref 8.3–10.1)
CHLORIDE SERPL-SCNC: 100 MMOL/L (ref 100–108)
CO2 SERPL-SCNC: 24 MMOL/L (ref 21–32)
CREAT SERPL-MCNC: 0.89 MG/DL (ref 0.6–1.3)
EOSINOPHIL # BLD MANUAL: 0 THOUSAND/UL (ref 0–0.4)
EOSINOPHIL NFR BLD MANUAL: 0 % (ref 0–6)
ERYTHROCYTE [DISTWIDTH] IN BLOOD BY AUTOMATED COUNT: 14.8 % (ref 11.6–15.1)
GFR SERPL CREATININE-BSD FRML MDRD: >60 ML/MIN/1.73SQ M
GLUCOSE SERPL-MCNC: 218 MG/DL (ref 65–140)
GLUCOSE SERPL-MCNC: 268 MG/DL (ref 65–140)
GLUCOSE SERPL-MCNC: 296 MG/DL (ref 65–140)
HCT VFR BLD AUTO: 39.8 % (ref 36.5–49.3)
HGB BLD-MCNC: 14 G/DL (ref 12–17)
LYMPHOCYTES # BLD AUTO: 1.44 THOUSAND/UL (ref 0.6–4.47)
LYMPHOCYTES # BLD AUTO: 12 % (ref 14–44)
MCH RBC QN AUTO: 31.7 PG (ref 26.8–34.3)
MCHC RBC AUTO-ENTMCNC: 35.2 G/DL (ref 31.4–37.4)
MCV RBC AUTO: 90 FL (ref 82–98)
MONOCYTES # BLD AUTO: 0.24 THOUSAND/UL (ref 0–1.22)
MONOCYTES NFR BLD: 2 % (ref 4–12)
NEUTROPHILS # BLD MANUAL: 10.29 THOUSAND/UL (ref 1.85–7.62)
NEUTS SEG NFR BLD AUTO: 86 % (ref 43–75)
NRBC BLD AUTO-RTO: 4 /100 WBCS
PLATELET # BLD AUTO: 366 THOUSANDS/UL (ref 149–390)
PLATELET BLD QL SMEAR: ADEQUATE
PMV BLD AUTO: 11.5 FL (ref 8.9–12.7)
POTASSIUM SERPL-SCNC: 4.6 MMOL/L (ref 3.5–5.3)
PROT SERPL-MCNC: 8 G/DL (ref 6.4–8.2)
RBC # BLD AUTO: 4.41 MILLION/UL (ref 3.88–5.62)
RBC MORPH BLD: PRESENT
SODIUM SERPL-SCNC: 133 MMOL/L (ref 136–145)
WBC # BLD AUTO: 11.96 THOUSAND/UL (ref 4.31–10.16)
WBC TOXIC VACUOLES BLD QL SMEAR: PRESENT

## 2017-07-17 PROCEDURE — 85027 COMPLETE CBC AUTOMATED: CPT | Performed by: INTERNAL MEDICINE

## 2017-07-17 PROCEDURE — 80053 COMPREHEN METABOLIC PANEL: CPT | Performed by: INTERNAL MEDICINE

## 2017-07-17 PROCEDURE — 94640 AIRWAY INHALATION TREATMENT: CPT

## 2017-07-17 PROCEDURE — 85007 BL SMEAR W/DIFF WBC COUNT: CPT | Performed by: INTERNAL MEDICINE

## 2017-07-17 PROCEDURE — 82948 REAGENT STRIP/BLOOD GLUCOSE: CPT

## 2017-07-17 PROCEDURE — 94760 N-INVAS EAR/PLS OXIMETRY 1: CPT

## 2017-07-17 RX ORDER — CLINDAMYCIN HYDROCHLORIDE 300 MG/1
600 CAPSULE ORAL 4 TIMES DAILY
Qty: 80 CAPSULE | Refills: 0 | Status: SHIPPED | OUTPATIENT
Start: 2017-07-17 | End: 2017-07-27

## 2017-07-17 RX ADMIN — ASPIRIN 81 MG: 81 TABLET, COATED ORAL at 08:54

## 2017-07-17 RX ADMIN — FLUTICASONE PROPIONATE 1 SPRAY: 50 SPRAY, METERED NASAL at 08:56

## 2017-07-17 RX ADMIN — ATENOLOL 50 MG: 50 TABLET ORAL at 08:52

## 2017-07-17 RX ADMIN — FUROSEMIDE 40 MG: 40 TABLET ORAL at 08:54

## 2017-07-17 RX ADMIN — INSULIN LISPRO 12 UNITS: 100 INJECTION, SOLUTION INTRAVENOUS; SUBCUTANEOUS at 12:56

## 2017-07-17 RX ADMIN — FLUOXETINE 40 MG: 20 CAPSULE ORAL at 08:54

## 2017-07-17 RX ADMIN — ALBUTEROL SULFATE 2.5 MG: 2.5 SOLUTION RESPIRATORY (INHALATION) at 07:40

## 2017-07-17 RX ADMIN — BENZTROPINE MESYLATE 1 MG: 1 TABLET ORAL at 08:57

## 2017-07-17 RX ADMIN — INSULIN LISPRO 8 UNITS: 100 INJECTION, SOLUTION INTRAVENOUS; SUBCUTANEOUS at 08:57

## 2017-07-17 RX ADMIN — INSULIN GLARGINE 40 UNITS: 100 INJECTION, SOLUTION SUBCUTANEOUS at 08:55

## 2017-07-17 RX ADMIN — DEXAMETHASONE SODIUM PHOSPHATE 4 MG: 4 INJECTION, SOLUTION INTRAMUSCULAR; INTRAVENOUS at 10:52

## 2017-07-17 RX ADMIN — ENOXAPARIN SODIUM 40 MG: 40 INJECTION SUBCUTANEOUS at 08:55

## 2017-07-17 RX ADMIN — PANTOPRAZOLE SODIUM 40 MG: 40 TABLET, DELAYED RELEASE ORAL at 05:43

## 2017-07-17 RX ADMIN — CLINDAMYCIN PHOSPHATE 600 MG: 12 INJECTION, SOLUTION INTRAMUSCULAR; INTRAVENOUS at 05:43

## 2017-07-17 RX ADMIN — OXYCODONE HYDROCHLORIDE AND ACETAMINOPHEN 2 TABLET: 5; 325 TABLET ORAL at 08:55

## 2017-07-17 RX ADMIN — LOSARTAN POTASSIUM 50 MG: 50 TABLET, FILM COATED ORAL at 08:55

## 2017-07-17 RX ADMIN — BUDESONIDE AND FORMOTEROL FUMARATE DIHYDRATE 2 PUFF: 160; 4.5 AEROSOL RESPIRATORY (INHALATION) at 08:56

## 2017-07-17 RX ADMIN — ALPRAZOLAM 1 MG: 0.5 TABLET ORAL at 08:53

## 2017-07-17 RX ADMIN — HALOPERIDOL 10 MG: 5 TABLET ORAL at 08:54

## 2017-07-17 RX ADMIN — DEXAMETHASONE SODIUM PHOSPHATE 4 MG: 4 INJECTION, SOLUTION INTRAMUSCULAR; INTRAVENOUS at 05:43

## 2022-09-28 ENCOUNTER — OFFICE VISIT (OUTPATIENT)
Dept: UROLOGY | Facility: HOSPITAL | Age: 57
End: 2022-09-28
Payer: COMMERCIAL

## 2022-09-28 VITALS — DIASTOLIC BLOOD PRESSURE: 78 MMHG | SYSTOLIC BLOOD PRESSURE: 120 MMHG | OXYGEN SATURATION: 99 % | HEART RATE: 75 BPM

## 2022-09-28 DIAGNOSIS — N50.819 PAIN IN TESTICLE, UNSPECIFIED LATERALITY: ICD-10-CM

## 2022-09-28 DIAGNOSIS — N52.9 ERECTILE DYSFUNCTION, UNSPECIFIED ERECTILE DYSFUNCTION TYPE: ICD-10-CM

## 2022-09-28 DIAGNOSIS — Z80.42 FAMILY HISTORY OF PROSTATE CANCER: ICD-10-CM

## 2022-09-28 DIAGNOSIS — R31.0 GROSS HEMATURIA: ICD-10-CM

## 2022-09-28 DIAGNOSIS — N40.1 BENIGN PROSTATIC HYPERPLASIA WITH LOWER URINARY TRACT SYMPTOMS, SYMPTOM DETAILS UNSPECIFIED: Primary | ICD-10-CM

## 2022-09-28 DIAGNOSIS — R97.20 ELEVATED PSA: ICD-10-CM

## 2022-09-28 LAB
POST-VOID RESIDUAL VOLUME, ML POC: 6 ML
SL AMB  POCT GLUCOSE, UA: NORMAL
SL AMB LEUKOCYTE ESTERASE,UA: NORMAL
SL AMB POCT BILIRUBIN,UA: NORMAL
SL AMB POCT BLOOD,UA: NORMAL
SL AMB POCT CLARITY,UA: CLEAR
SL AMB POCT COLOR,UA: YELLOW
SL AMB POCT KETONES,UA: NORMAL
SL AMB POCT NITRITE,UA: NORMAL
SL AMB POCT PH,UA: NORMAL
SL AMB POCT SPECIFIC GRAVITY,UA: 1.01
SL AMB POCT URINE PROTEIN: NORMAL
SL AMB POCT UROBILINOGEN: 0.2

## 2022-09-28 PROCEDURE — 99204 OFFICE O/P NEW MOD 45 MIN: CPT | Performed by: NURSE PRACTITIONER

## 2022-09-28 PROCEDURE — 81002 URINALYSIS NONAUTO W/O SCOPE: CPT | Performed by: NURSE PRACTITIONER

## 2022-09-28 PROCEDURE — 51798 US URINE CAPACITY MEASURE: CPT | Performed by: NURSE PRACTITIONER

## 2022-09-28 RX ORDER — TAMSULOSIN HYDROCHLORIDE 0.4 MG/1
0.4 CAPSULE ORAL
COMMUNITY

## 2022-09-28 RX ORDER — VENLAFAXINE 75 MG/1
75 TABLET ORAL 2 TIMES DAILY
COMMUNITY

## 2022-09-28 RX ORDER — TOPIRAMATE 50 MG/1
50 TABLET, FILM COATED ORAL EVERY 12 HOURS SCHEDULED
COMMUNITY

## 2022-09-28 RX ORDER — CITALOPRAM 10 MG/1
10 TABLET ORAL DAILY
COMMUNITY

## 2022-09-28 RX ORDER — PALIPERIDONE 6 MG/1
6 TABLET, EXTENDED RELEASE ORAL EVERY MORNING
COMMUNITY

## 2022-09-28 RX ORDER — DOXEPIN HYDROCHLORIDE 50 MG/1
50 CAPSULE ORAL
COMMUNITY

## 2022-09-28 NOTE — PROGRESS NOTES
9/28/2022    Arturo Ham  1965  [id number]      Assessment  -BPH with lower urinary tract symptoms  -Prostate cancer screening  -Strong family history of prostate cancer  -Elevated PSA  -Gross hematuria    Discussion/Plan  Christine Williamson is a 61 y o  male who presents in consultation     1  BPH with lower urinary tract symptoms, gross hematuria- urine dip in the office today appears negative for infection or blood  We will send for urinalysis with microscopic, culture, and cytology  PVR assessment is 6 mL  Given his reports of intermittent gross hematuria and significant smoking history, we reviewed proceeding with hematuria workup including CT renal protocol and flexible cystoscopy  We will also evaluate bladder outlet given his worsening lower urinary tract symptoms despite tamsulosin  Informed consent was provided  Scrotal ultrasound also ordered to further assess orchalgia  In the interim, he will remain on tamsulosin 0 8 mg daily  2  Prostate cancer screening, elevated PSA, strong family history of prostate cancer- will obtain records from prior urologist   However, PSAs were reviewed in the office on patient's cellphone  His most recent PSA was 6 0 and has been as high as 15 7  Given his strong family history of prostate cancer, we discussed proceeding with prostate biopsy concerns of possible malignancy  We discussed biopsy preparation instructions as well as informed consent and postoperative care and expectations  He verbalizes understanding  Follow-up with MD for cystoscopy and transurethral ultrasound-guided prostate biopsy  Will review results of CT scan and scrotal ultrasound  Was advised to call sooner with any questions or issues     -All questions answered, patient agrees with plan      History of Present Illness  61 y o  male who presents in consultation today for evaluation of BPH and prostate cancer screening  He was referred by his PCP    Patient states he had previously followed with an outside urologist   He resides in [place]  Patient states he had followed with his prior urologist since 2019  He had been experiencing intermittent episodes of gross hematuria, lower urinary tract symptoms of weak stream, frequency, urgency, and postvoid dribbling, and has only been prescribed several antibiotics  Patient states he recently completed a 3 week course of antibiotics 1 week ago  Patient currently on tamsulosin 0 8 mg daily  He has noticed no improvement in his symptoms  He cannot recall the last time he saw gross hematuria, but he describes as bright red in color without any blood clots  Patient also notes bilateral testicular discomfort  He reports prior swelling  Patient states he has never received invasive testing, ultrasound imaging, or prostate biopsy  No prior records available for review  However, patient has results of PSA available on his cellphone  His last PSA from 09/07/2022 was 6 0  Prior PSA in December 2021 was 5 2 and 15 7 in October 2021  Patient states his father had a history of prostate cancer and underwent radical prostatectomy  He has been smoking intermittently for many years and more recently restarted smoking 1 year ago  He smokes 1 pack of small cigars daily  Patient denies any family history of bladder malignancy  He denies any use of alcohol or illicit drugs  Patient states he had sexual activity with a woman over 20 years ago who was positive for HPV  He states he has had difficulties maintaining an erection since that time  Patient is a diabetic and currently on Topamax, Effexor, and Celexa  Review of Systems  Review of Systems   Constitutional: Negative  HENT: Negative  Respiratory: Negative  Cardiovascular: Negative  Gastrointestinal: Negative  Genitourinary: Positive for decreased urine volume, difficulty urinating, frequency, hematuria, testicular pain and urgency   Negative for dysuria, flank pain and scrotal swelling  Musculoskeletal: Negative  Skin: Negative  Neurological: Negative  Psychiatric/Behavioral: Negative  Past Medical History  Past Medical History:   Diagnosis Date    BPH (benign prostatic hyperplasia)     Cellulitis     CHF (congestive heart failure) (HCC)     Chronic airway obstruction (HCC)     Chronic back pain     Colon polyps     Coronary artery disease     Degenerative, intervertebral disc     Diabetes mellitus (HCC)     DJD (degenerative joint disease)     GERD (gastroesophageal reflux disease)     Gout     Hepatitis C, chronic (HCC)     Hyperlipidemia     Hypersomnia     Hypogonadism, male     Hypothyroidism     Morbid obesity ([facility] [address] )     Osteoarthritis     Polyneuropathy     Psychiatric disorder     paranoid schizophrenia; panic disorder; PTSD; anxiety; major depression    Sinusitis, chronic     Sleep apnea        Past Social History  Past Surgical History:   Procedure Laterality Date    OTHER SURGICAL HISTORY Left     excision keloids ear       Past Family History  History reviewed  No pertinent family history      Past Social history  Social History     Socioeconomic History    Marital status: Single     Spouse name: Not on file    Number of children: Not on file    Years of education: Not on file    Highest education level: Not on file   Occupational History    Not on file   Tobacco Use    Smoking status: Current Every Day Smoker    Smokeless tobacco: Not on file   Substance and Sexual Activity    Alcohol use: No    Drug use: No    Sexual activity: Not on file   Other Topics Concern    Not on file   Social History Narrative    Not on file     Social Determinants of Health     Financial Resource Strain: Not on file   Food Insecurity: Not on file   Transportation Needs: Not on file   Physical Activity: Not on file   Stress: Not on file   Social Connections: Not on file   Intimate Partner Violence: Not on file   Housing Stability: Not on file       Current Medications  Current Outpatient Medications   Medication Sig Dispense Refill    aspirin (ECOTRIN LOW STRENGTH) 81 mg EC tablet Take 81 mg by mouth daily   citalopram (CeleXA) 10 mg tablet Take 10 mg by mouth daily      doxepin (SINEquan) 50 mg capsule Take 50 mg by mouth daily at bedtime      furosemide (LASIX) 40 mg tablet Take 40 mg by mouth 2 (two) times a day   metFORMIN (GLUCOPHAGE) 1000 MG tablet Take 1,000 mg by mouth 2 (two) times a day with meals      paliperidone (INVEGA) 6 MG 24 hr tablet Take 6 mg by mouth every morning      sitaGLIPtin-metFORMIN (JANUMET)  MG per tablet Take 1 tablet by mouth 2 (two) times a day   tamsulosin (Flomax) 0 4 mg Take 0 4 mg by mouth daily with dinner      topiramate (TOPAMAX) 50 MG tablet Take 50 mg by mouth every 12 (twelve) hours      venlafaxine (EFFEXOR) 75 mg tablet Take 75 mg by mouth 2 (two) times a day      albuterol (2 5 mg/3 mL) 0 083 % nebulizer solution Take 2 5 mg by nebulization 3 (three) times a day Indications: as directed  (Patient not taking: Reported on 9/28/2022)      albuterol (PROVENTIL HFA,VENTOLIN HFA) 90 mcg/act inhaler Inhale 2 puffs 2 (two) times a day  (Patient not taking: Reported on 9/28/2022)      ALPRAZolam (XANAX) 1 mg tablet Take 1 mg by mouth daily  (Patient not taking: Reported on 9/28/2022)      atenolol (TENORMIN) 50 mg tablet Take 50 mg by mouth daily  (Patient not taking: Reported on 9/28/2022)      benztropine (COGENTIN) 1 mg tablet Take 1 mg by mouth daily  (Patient not taking: Reported on 9/28/2022)      budesonide-formoterol (SYMBICORT) 160-4 5 mcg/act inhaler Inhale 2 puffs 2 (two) times a day  (Patient not taking: Reported on 9/28/2022)      doxazosin (CARDURA) 4 mg tablet Take 4 mg by mouth daily at bedtime  (Patient not taking: Reported on 9/28/2022)      Exenatide ER 2 MG PEN Inject 2 mg under the skin once a week   (Patient not taking: Reported on 9/28/2022)      FLUoxetine (PROzac) 40 MG capsule Take 40 mg by mouth daily  (Patient not taking: Reported on 9/28/2022)      fluticasone (FLONASE) 50 mcg/act nasal spray 1 spray into each nostril 2 (two) times a day Indications: each nostril  (Patient not taking: Reported on 9/28/2022)      glimepiride (AMARYL) 2 mg tablet Take 2 mg by mouth 2 (two) times a day  (Patient not taking: Reported on 9/28/2022)      haloperidol (HALDOL) 10 mg tablet Take 10 mg by mouth daily  (Patient not taking: Reported on 9/28/2022)      insulin glargine (LANTUS) 100 units/mL subcutaneous injection Inject 20 Units under the skin daily  (Patient not taking: Reported on 9/28/2022)      insulin lispro (HumaLOG) 100 units/mL injection Inject 15 Units under the skin 3 (three) times a day before meals  (Patient not taking: Reported on 9/28/2022)      lidocaine (LIDODERM) 5 % Place 1 patch on the skin every 24 hours  Remove & Discard patch within 12 hours or as directed by MD (Patient not taking: Reported on 9/28/2022)      losartan (COZAAR) 50 mg tablet Take 50 mg by mouth 2 (two) times a day  (Patient not taking: Reported on 9/28/2022)      omeprazole (PriLOSEC) 40 MG capsule Take 40 mg by mouth 2 (two) times a day  (Patient not taking: Reported on 9/28/2022)      oxyCODONE-acetaminophen (PERCOCET) 7 5-325 MG per tablet Take 1 tablet by mouth 3 (three) times a day  (Patient not taking: Reported on 9/28/2022)       No current facility-administered medications for this visit  Allergies  Allergies   Allergen Reactions    Lisinopril Swelling and Anaphylaxis    Seroquel [Quetiapine]        Past Medical History, Social History, Family History, medications and allergies were reviewed  Vitals  Vitals:    09/28/22 1313   BP: 120/78   Pulse: 75   SpO2: 99%       Physical Exam  Physical Exam  Constitutional:       Appearance: Normal appearance  He is well-developed  He is obese  HENT:      Head: Normocephalic     Eyes:      Pupils: Pupils are equal, round, and reactive to light  Pulmonary:      Effort: Pulmonary effort is normal    Abdominal:      Palpations: Abdomen is soft  Tenderness: There is no right CVA tenderness or left CVA tenderness  Genitourinary:     Penis: Normal        Testes: Normal       Prostate: Normal       Rectum: Normal       Comments: Penis buried, large pannus, testicles descended bilaterally, no scrotal edema or erythema, scrotum nontender     Difficulty palpating prostate due to body habitus, no significant findings were palpated  Musculoskeletal:         General: Normal range of motion  Cervical back: Normal range of motion  Skin:     General: Skin is warm and dry  Neurological:      General: No focal deficit present  Mental Status: He is alert and oriented to person, place, and time  Psychiatric:         Mood and Affect: Mood normal          Behavior: Behavior normal          Thought Content:  Thought content normal          Judgment: Judgment normal          Results    I have personally reviewed all pertinent lab results and reviewed with patient  No results found for: PSA  Lab Results   Component Value Date    CALCIUM 8 7 07/17/2017    K 4 6 07/17/2017    CO2 24 07/17/2017     07/17/2017    BUN 20 07/17/2017    CREATININE 0 89 07/17/2017     Lab Results   Component Value Date    WBC 11 96 (H) 07/17/2017    HGB 14 0 07/17/2017    HCT 39 8 07/17/2017    MCV 90 07/17/2017     07/17/2017     Recent Results (from the past 1 hour(s))   POCT urine dip    Collection Time: 09/28/22  1:05 PM   Result Value Ref Range    LEUKOCYTE ESTERASE,UA -     NITRITE,UA -     SL AMB POCT UROBILINOGEN 0 2     POCT URINE PROTEIN -      PH,UA Trace     BLOOD,UA -     SPECIFIC GRAVITY,UA 1 010     KETONES,UA -     BILIRUBIN,UA -     GLUCOSE, UA -      COLOR,UA yellow     CLARITY,UA clear    POCT Measure PVR    Collection Time: 09/28/22  1:15 PM   Result Value Ref Range    POST-VOID RESIDUAL VOLUME, ML POC 6 mL

## 2022-09-28 NOTE — PATIENT INSTRUCTIONS
Cystoscopy   AMBULATORY CARE:   A cystoscopy  is a procedure to look inside your urethra and bladder using a cystoscope  A cystoscope is a small tube with a light and magnifying camera on the end  The procedure is used to diagnose and treat conditions of the bladder, urethra, or prostate  Your healthcare provider may also do a ureteroscopy during a cystoscopy  A ureteroscopy is a procedure to look inside your ureters and kidneys  Prepare for your cystoscopy: Your healthcare provider will talk to you about how to prepare for your procedure  He or she will tell you what medicines to take and not to take on the day of your procedure  You may need to stop taking medicines such as anticoagulants, aspirin, and ibuprofen several days before your procedure  Your provider may tell you stop eating after midnight the night before your procedure  You may be asked to drink a large amount of liquids before your procedure  Arrange for a ride home after your procedure  You will not be allowed to drive yourself home  During your cystoscopy: You may be given general anesthesia to keep you asleep and pain free during your procedure  Your healthcare provider may instead use local anesthesia  It is put into your urethra and bladder  You will not feel pain, but you may be able to feel some pressure during your procedure  With local anesthesia, you may feel burning or need to urinate when the cystoscope is put in and removed  If you are female, you will be placed on your back and your feet may be placed in stirrups  If you are male, you will be placed on your back or in a sitting position  The cystoscope will be placed through your urethra and into your bladder  The urologist will look at the walls of your urethra as the scope goes through to your bladder  Your bladder will be filled with liquid so your urologist can see the inside of your bladder more clearly   Tools may be inserted through the cystoscope to treat any problems in your urethra or bladder  Your provider may also take a sample of tissue and send it to a lab for tests  After your cystoscopy:  After you are fully awake, you will go home  You may see small amounts of blood in your urine  This is normal  It is also normal to have an increased need to urinate  You may also have burning or mild discomfort in your bladder or kidney area when you urinate  If you had general anesthesia, it may take at least 24 hours before you feel like your usual self  Risks of a cystoscopy: You may bleed more than expected or develop an infection  Your urethra, bladder, or ureters may get damaged during your procedure  You may have abdominal pain  Swelling caused by the cystoscopy may cause a blockage or slow urine flow  Seek care immediately if:   Your urine turns from pink to red, or you have clots in your urine  You cannot urinate and your bladder feels full  You have severe pain  Contact your healthcare provider or urologist if:   Your pain or burning during urination becomes worse or lasts longer than 1 day  Your urine stays pink for longer than 1 day  You have a fever and chills  You urinate less than usual, or still feel like you have to urinate after you use the bathroom  You have questions or concerns about your condition or care  Medicines: You may  be given any of the following:  Antibiotics  help treat or prevent a bacterial infection  Acetaminophen  decreases pain and fever  It is available without a doctor's order  Ask how much to take and how often to take it  Follow directions  Read the labels of all other medicines you are using to see if they also contain acetaminophen, or ask your doctor or pharmacist  Acetaminophen can cause liver damage if not taken correctly  Do not use more than 4 grams (4,000 milligrams) total of acetaminophen in one day  Take your medicine as directed    Contact your healthcare provider if you think your medicine is not helping or if you have side effects  Tell him or her if you are allergic to any medicine  Keep a list of the medicines, vitamins, and herbs you take  Include the amounts, and when and why you take them  Bring the list or the pill bottles to follow-up visits  Carry your medicine list with you in case of an emergency  Self-care:   Drink liquids as directed  Your healthcare provider may recommend that you drink 6 to 8 eight-ounce cups of water every day for 2 days after your procedure  Apply a warm, damp washcloth over your urethral opening  This may help to relieve discomfort  Ask when you can return to regular daily activities  Your healthcare provider may recommend that you rest after your procedure  Do not have sex  until your healthcare provider tells you it is okay  Sex may increase your risk for a urinary tract infection  Follow up with your healthcare provider as directed:  Write down your questions so you remember to ask them during your visits  © Copyright [employer] 2022 Information is for End User's use only and may not be sold, redistributed or otherwise used for commercial purposes  All illustrations and images included in CareNotes® are the copyrighted property of [employer]  or Akanksha Hagan   The above information is an  only  It is not intended as medical advice for individual conditions or treatments  Talk to your doctor, nurse or pharmacist before following any medical regimen to see if it is safe and effective for you

## 2022-09-30 LAB
APPEARANCE UR: CLEAR
BACTERIA UR CULT: NORMAL
BACTERIA URNS QL MICRO: NORMAL
BILIRUB UR QL STRIP: NEGATIVE
CASTS URNS QL MICRO: NORMAL /LPF
COLOR UR: YELLOW
COUNSELING NOTE: NORMAL
CYTOLOGIST CVX/VAG CYTO: NORMAL
DX ICD CODE: NORMAL
EPI CELLS #/AREA URNS HPF: NORMAL /HPF (ref 0–10)
GLUCOSE UR QL: NEGATIVE
HGB UR QL STRIP: NEGATIVE
KETONES UR QL STRIP: NEGATIVE
LEUKOCYTE ESTERASE UR QL STRIP: NEGATIVE
Lab: NO GROWTH
MICRO URNS: ABNORMAL
NITRITE UR QL STRIP: NEGATIVE
PATH REPORT.FINAL DX SPEC: NORMAL
PATH REPORT.GROSS SPEC: NORMAL
PATH REPORT.SITE OF ORIGIN SPEC: NORMAL
PATHOLOGIST NAME: NORMAL
PH UR STRIP: 7.5 [PH] (ref 5–7.5)
PROT UR QL STRIP: ABNORMAL
RBC #/AREA URNS HPF: NORMAL /HPF (ref 0–2)
SP GR UR: 1.03 (ref 1–1.03)
UROBILINOGEN UR STRIP-ACNC: 1 MG/DL (ref 0.2–1)
WBC #/AREA URNS HPF: NORMAL /HPF (ref 0–5)

## 2022-10-11 ENCOUNTER — TELEPHONE (OUTPATIENT)
Dept: OTHER | Facility: OTHER | Age: 57
End: 2022-10-11

## 2022-10-11 DIAGNOSIS — N40.1 BPH WITH OBSTRUCTION/LOWER URINARY TRACT SYMPTOMS: Primary | ICD-10-CM

## 2022-10-11 DIAGNOSIS — N13.8 BPH WITH OBSTRUCTION/LOWER URINARY TRACT SYMPTOMS: Primary | ICD-10-CM

## 2022-10-11 NOTE — TELEPHONE ENCOUNTER
Clarissa Pritchard called in from Radiology stating pt needs a stat CMP to check kidney functions before he can have his CT Scan with contrast on Thursday  Pt will need to be called to be made aware of needing blood work before his scan  Please advise

## 2022-10-12 NOTE — TELEPHONE ENCOUNTER
Nadya and he has not transportation    He is going to try and get blood work tomorrow @ 12 prior to Ct scan

## 2022-10-13 ENCOUNTER — APPOINTMENT (OUTPATIENT)
Dept: LAB | Facility: HOSPITAL | Age: 57
End: 2022-10-13
Payer: COMMERCIAL

## 2022-10-13 ENCOUNTER — HOSPITAL ENCOUNTER (OUTPATIENT)
Dept: ULTRASOUND IMAGING | Facility: HOSPITAL | Age: 57
End: 2022-10-13
Payer: COMMERCIAL

## 2022-10-13 ENCOUNTER — HOSPITAL ENCOUNTER (OUTPATIENT)
Dept: CT IMAGING | Facility: HOSPITAL | Age: 57
Discharge: HOME | End: 2022-10-13
Payer: COMMERCIAL

## 2022-10-13 DIAGNOSIS — N50.819 PAIN IN TESTICLE, UNSPECIFIED LATERALITY: ICD-10-CM

## 2022-10-13 DIAGNOSIS — N40.3 NODULAR PROSTATE WITH URINARY OBSTRUCTION: ICD-10-CM

## 2022-10-13 DIAGNOSIS — N13.8 NODULAR PROSTATE WITH URINARY OBSTRUCTION: ICD-10-CM

## 2022-10-13 DIAGNOSIS — R31.0 GROSS HEMATURIA: ICD-10-CM

## 2022-10-13 DIAGNOSIS — N40.1 ENLARGED PROSTATE WITH URINARY OBSTRUCTION: ICD-10-CM

## 2022-10-13 DIAGNOSIS — N13.8 ENLARGED PROSTATE WITH URINARY OBSTRUCTION: ICD-10-CM

## 2022-10-13 LAB
ANION GAP SERPL CALCULATED.3IONS-SCNC: 11 MMOL/L (ref 4–13)
BUN SERPL-MCNC: 17 MG/DL (ref 5–25)
CALCIUM SERPL-MCNC: 9.2 MG/DL (ref 8.3–10.1)
CHLORIDE SERPL-SCNC: 108 MMOL/L (ref 96–108)
CO2 SERPL-SCNC: 22 MMOL/L (ref 21–32)
CREAT SERPL-MCNC: 0.93 MG/DL (ref 0.6–1.3)
GFR SERPL CREATININE-BSD FRML MDRD: 90 ML/MIN/1.73SQ M
GLUCOSE P FAST SERPL-MCNC: 110 MG/DL (ref 65–99)
POTASSIUM SERPL-SCNC: 4 MMOL/L (ref 3.5–5.3)
SODIUM SERPL-SCNC: 141 MMOL/L (ref 135–147)

## 2022-10-13 PROCEDURE — 74178 CT ABD&PLV WO CNTR FLWD CNTR: CPT

## 2022-10-13 PROCEDURE — 36415 COLL VENOUS BLD VENIPUNCTURE: CPT

## 2022-10-13 PROCEDURE — 80048 BASIC METABOLIC PNL TOTAL CA: CPT

## 2022-10-13 PROCEDURE — 76870 US EXAM SCROTUM: CPT

## 2022-10-13 PROCEDURE — G1004 CDSM NDSC: HCPCS

## 2022-10-13 RX ADMIN — IOHEXOL 100 ML: 350 INJECTION, SOLUTION INTRAVENOUS at 13:23

## 2022-11-18 ENCOUNTER — PROCEDURE VISIT (OUTPATIENT)
Dept: UROLOGY | Facility: HOSPITAL | Age: 57
End: 2022-11-18

## 2022-11-18 VITALS — DIASTOLIC BLOOD PRESSURE: 80 MMHG | BODY MASS INDEX: 57.93 KG/M2 | SYSTOLIC BLOOD PRESSURE: 142 MMHG | HEIGHT: 71 IN

## 2022-11-18 DIAGNOSIS — R31.0 GROSS HEMATURIA: ICD-10-CM

## 2022-11-18 DIAGNOSIS — N13.8 BPH WITH URINARY OBSTRUCTION: ICD-10-CM

## 2022-11-18 DIAGNOSIS — N40.1 BPH WITH URINARY OBSTRUCTION: ICD-10-CM

## 2022-11-18 DIAGNOSIS — R97.20 ELEVATED PSA: Primary | ICD-10-CM

## 2022-11-18 LAB
SL AMB  POCT GLUCOSE, UA: NORMAL
SL AMB LEUKOCYTE ESTERASE,UA: NORMAL
SL AMB POCT BILIRUBIN,UA: NORMAL
SL AMB POCT BLOOD,UA: NORMAL
SL AMB POCT CLARITY,UA: CLEAR
SL AMB POCT COLOR,UA: YELLOW
SL AMB POCT KETONES,UA: NORMAL
SL AMB POCT NITRITE,UA: NORMAL
SL AMB POCT PH,UA: 7.5
SL AMB POCT SPECIFIC GRAVITY,UA: 1.01
SL AMB POCT URINE PROTEIN: NORMAL
SL AMB POCT UROBILINOGEN: 0.2

## 2022-11-18 RX ORDER — FINASTERIDE 5 MG/1
5 TABLET, FILM COATED ORAL DAILY
Qty: 90 TABLET | Refills: 3 | Status: SHIPPED | OUTPATIENT
Start: 2022-11-18

## 2022-11-18 RX ORDER — CEFTRIAXONE 1 G/1
1000 INJECTION, POWDER, FOR SOLUTION INTRAMUSCULAR; INTRAVENOUS ONCE
Status: COMPLETED | OUTPATIENT
Start: 2022-11-18 | End: 2022-11-18

## 2022-11-18 RX ADMIN — CEFTRIAXONE 1000 MG: 1 INJECTION, POWDER, FOR SOLUTION INTRAMUSCULAR; INTRAVENOUS at 13:22

## 2022-11-22 ENCOUNTER — TELEPHONE (OUTPATIENT)
Dept: UROLOGY | Facility: AMBULATORY SURGERY CENTER | Age: 57
End: 2022-11-22

## 2022-11-22 DIAGNOSIS — C61 PROSTATE CANCER (HCC): Primary | ICD-10-CM

## 2022-11-22 DIAGNOSIS — N50.819 PAIN IN TESTICLE, UNSPECIFIED LATERALITY: Primary | ICD-10-CM

## 2022-11-22 NOTE — PROGRESS NOTES
I spoke to patient on phone, informed had a cyst prostate biopsy showed extensive cancer in most of the cores of the prostate  [place] seven and [place] eight  PSA was 6 0  That results was not on the epic chart, patient showed it to us from his cell phone, an outside lab  I told him we will schedule CT and bone scan, then office visit to discuss further    I will recommend radiation therapy to a because of his severe comorbidities

## 2022-11-22 NOTE — TELEPHONE ENCOUNTER
----- Message from Magali Garcia MD sent at 11/22/2022  2:44 PM EST -----  See note from today, prostate biopsy positive  Bone scan and CT ordered    Needs a  Short office visit two weeks after the scans are both done, so we have the results

## 2022-11-23 RX ORDER — IBUPROFEN 600 MG/1
600 TABLET ORAL EVERY 6 HOURS PRN
Qty: 30 TABLET | Refills: 0 | Status: SHIPPED | OUTPATIENT
Start: 2022-11-23

## 2022-11-23 NOTE — TELEPHONE ENCOUNTER
Patient called today regarding he needs an update for the treatment of his Prostate Cancer  Please call patient back today

## 2022-11-23 NOTE — TELEPHONE ENCOUNTER
NO narcotics are warranted for the results of the US of scrotum and testicle  Pt should take Ibuprofen 600 mg every 6 hours as needed for pain    Provided scrotal elevation and immobilization

## 2022-11-23 NOTE — TELEPHONE ENCOUNTER
[facility] and notified him about scheduled bone scan and ct scan - gave him the phone number to call  Once he has these scheduled he is to call back so we can set up a folow up apppointment   Also he is requesting pain medication for his testicle pain   The phamacy ([facility] in [place]) listed in chart is correct

## 2022-12-16 ENCOUNTER — HOSPITAL ENCOUNTER (OUTPATIENT)
Dept: NUCLEAR MEDICINE | Facility: HOSPITAL | Age: 57
End: 2022-12-16
Attending: UROLOGY

## 2022-12-16 ENCOUNTER — HOSPITAL ENCOUNTER (OUTPATIENT)
Dept: NUCLEAR MEDICINE | Facility: HOSPITAL | Age: 57
Discharge: HOME | End: 2022-12-16
Attending: UROLOGY

## 2022-12-16 ENCOUNTER — HOSPITAL ENCOUNTER (OUTPATIENT)
Dept: CT IMAGING | Facility: HOSPITAL | Age: 57
Discharge: HOME | End: 2022-12-16
Attending: UROLOGY

## 2022-12-16 DIAGNOSIS — C61 PROSTATE CANCER (HCC): ICD-10-CM

## 2022-12-16 RX ADMIN — IOHEXOL 100 ML: 350 INJECTION, SOLUTION INTRAVENOUS at 12:11

## 2022-12-20 ENCOUNTER — TELEPHONE (OUTPATIENT)
Dept: UROLOGY | Facility: MEDICAL CENTER | Age: 57
End: 2022-12-20

## 2022-12-20 DIAGNOSIS — C61 PROSTATE CANCER (HCC): Primary | ICD-10-CM

## 2022-12-20 NOTE — TELEPHONE ENCOUNTER
Regi Liu MD  [facility] [facility] [place] [facility]  Call pt, normal results     Attempted to contact patient   No answer  Left voice message per media consent with results information

## 2022-12-20 NOTE — TELEPHONE ENCOUNTER
Called and spoke with [facility]  And explained to him that he needs to go to radiation onology and he had me talk with His aide    Explained that Dr Pippa Melo wished him to follow up with radiation onocology and I gave her the phone number to call [phone]

## 2022-12-20 NOTE — TELEPHONE ENCOUNTER
Per Dr Binh Weber NM scan is normal  U/s showed Left-sided varicoceles  And Bilateral epididymal head cysts  Please advise on follow up   He is scheduled for 6 month follow up - which was prior to testing Ct scan is still pending

## 2022-12-20 NOTE — TELEPHONE ENCOUNTER
Patient returning missed phone call, per patient, he would like an appt sooner than May as he would like to start his prostate CA treatment before then, he also would like something for pain stronger than Ibuprofen, patient would like a Rx for Tramadol

## 2022-12-20 NOTE — PROGRESS NOTES
See prior note regarding extensive Rex 8 prostate cancer  Patient is not a surgical candidate due to multiple comorbidities including extreme morbid obesity    I recommend radiation therapy, I discussed this with patient on the telephone when I gave him the results of the biopsy

## 2022-12-21 NOTE — TELEPHONE ENCOUNTER
[facility] called and would like patient's notes to go to his pcp who is Christin Mcintyre    If any questions, she can be reached at [phone]

## 2022-12-30 PROBLEM — C61 PROSTATE CANCER (HCC): Status: ACTIVE | Noted: 2022-12-30

## 2023-01-10 ENCOUNTER — TELEPHONE (OUTPATIENT)
Dept: OTHER | Facility: OTHER | Age: 58
End: 2023-01-10

## 2023-01-10 ENCOUNTER — TELEPHONE (OUTPATIENT)
Dept: OTHER | Facility: HOSPITAL | Age: 58
End: 2023-01-10

## 2023-01-10 DIAGNOSIS — C61 PROSTATE CANCER (HCC): Primary | ICD-10-CM

## 2023-01-10 NOTE — TELEPHONE ENCOUNTER
Patient was a no show for his consult on 1/6/23. Called him this morning to check in on him and he states he is going out of network for his radiation treatment.

## 2023-01-10 NOTE — TELEPHONE ENCOUNTER
Osorio Rios from [facility] called in stating she needs pt's last office visit notes and PSA's faxed to [phone]

## 2023-01-10 NOTE — TELEPHONE ENCOUNTER
Facility called stating they need PSA orders put into the patient's chart and faxed over to [facility]iology  Oncology @ [phone]

## 2023-01-19 NOTE — TELEPHONE ENCOUNTER
Demetrice Livingston from Radiology called and stated pt will be seen within 30 mins and Demetrice Livingston is requesting c/b regarding possible procedure pt might need after results of imaging with Dr Gerson Bradley    Call [phone]

## 2023-01-26 ENCOUNTER — TELEPHONE (OUTPATIENT)
Dept: OTHER | Facility: OTHER | Age: 58
End: 2023-01-26

## 2023-01-31 ENCOUNTER — TELEPHONE (OUTPATIENT)
Dept: UROLOGY | Facility: MEDICAL CENTER | Age: 58
End: 2023-01-31

## 2023-01-31 NOTE — TELEPHONE ENCOUNTER
See note regarding phone call today  Cancel May 2023 appointment  [facility] will be getting his posttreatment care by that radiation oncology office   Patient knows to call to see us  1 year after treatment

## 2023-01-31 NOTE — TELEPHONE ENCOUNTER
I spoke to patient about his pathology in early December after it was finalized  He says he was not able to get transportation to radiation oncology appointments in our system  He says he was seen by [facility] in [place] [address] and will be getting hormone treatment, and radiation therapy by them  You will not need her appointment in May  I told him he should see us 12 months after finishing the radiation treatments    He knows to call

## 2023-05-19 NOTE — PROGRESS NOTES
HISTORY:    Follow-up:  1  BPH with worsening symptoms  Voids small amounts frequently, every 1-2 hours day and night  2  Gross hematuria, none seen recently    3  Elevated PSA     Cystoscopy     Date/Time 11/18/2022 2:29 PM     Performed by  Jaleel Jasso MD     Authorized by Jaleel Jasso MD          Procedure Details:  Procedure type: cystoscopy    Patient tolerance: Patient tolerated the procedure well with no immediate complications    Additional Procedure Details:      Patient presents for cystoscopy  I have discussed the reasons for doing the exam, and the potential risks and complications  Patient expressed understanding, and signed informed consent document  The patient was carefully  positioned supine on the examining table  Sterile preparation was performed on the urethra  Xylocaine jelly was instilled and left  Indwelling for the procedure  The 13 Hebrew flexible cystoscope was passed with the following findings:      Urethra:  No stricture    Prostate:  lateral lobes moderately enlarged, occlusive, and median lobe minimal                    Bladder: Moderate trabeculations, no lesions, tumor, or stones  Residual urine:  Minimal    Patient tolerated the procedure well and was escorted from the examining table  Biopsy prostate     Date/Time 11/18/2022 2:30 PM     Performed by  Jaleel Jasso MD     Authorized by Jaleel Jasso MD      Procedure Details   Procedure Notes:   Preop diagnosis:  Elevated PSA    Prostate ISI abnormality       Postop diagnosis same    Procedure:  Ultrasound-guided transrectal needle biopsy prostate       The patient had undergone preoperative preparation with Fleet enema and antibiotics as described  Informed consent had been obtained  He was positioned in left lateral decubitus position  The ultrasound probe was gently inserted in the rectum  Ultrasound images of the prostate were obtained in two different planes    1% xylocaine, 5 mL on each side, was used to anesthetize the nerves on each side of the prostate  Prostate volume was measured at  45 mL  Careful examination for any abnormalities showed none  Twelve cores of tissue were taken with the 18 gauge biopsy needle, in the appropriate zones of the prostate  Minimal bleeding was encountered upon removal of the probe  No significant bleeding from the urethra was seen  After appropriate observation, he was accompanied to the bathroom, having tolerated the procedure well   pr         ASSESSMENT / PLAN:    1  Regarding BPH, minimal response to Flomax, will add finasteride, he knows it can take three months to work    2  Await prostate biopsy results     3  Nothing significant found regarding gross hematuria, cysto shows no bladder tumors, CT showed no kidney lesions  We will give him a phone results of the prostate biopsy     Follow-up six    The following portions of the patient's history were reviewed and updated as appropriate: allergies, current medications, past family history, past medical history, past social history, past surgical history and problem list     Review of Systems   Respiratory: Positive for shortness of breath  All other systems reviewed and are negative  Objective:     Physical Exam  Constitutional:       Appearance: He is obese             No results found for: PSA]  BUN   Date Value Ref Range Status   10/13/2022 17 5 - 25 mg/dL Final     Creatinine   Date Value Ref Range Status   10/13/2022 0 93 0 60 - 1 30 mg/dL Final     Comment:     Standardized to IDMS reference method     No components found for: CBC      Patient Active Problem List   Diagnosis   • CHF (congestive heart failure) (HCC)   • Sleep apnea   • Morbid obesity ([facility] [address] )   • Hypothyroidism   • GERD (gastroesophageal reflux disease)   • Diabetes mellitus ([facility] [address] )   • Coronary artery disease   • Peritonsillar abscess   • Hyponatremia        Diagnoses and all orders for this visit:    Elevated PSA  -     cefTRIAXone (ROCEPHIN) injection 1,000 mg  -     POCT urine dip  -     Biopsy prostate    BPH with urinary obstruction  -     finasteride (PROSCAR) 5 mg tablet; Take 1 tablet (5 mg total) by mouth daily    Gross hematuria  -     Cystoscopy           Patient ID: Manas Vela is a 61 y o  male  Current Outpatient Medications:   •  finasteride (PROSCAR) 5 mg tablet, Take 1 tablet (5 mg total) by mouth daily, Disp: 90 tablet, Rfl: 3  •  albuterol (2 5 mg/3 mL) 0 083 % nebulizer solution, Take 2 5 mg by nebulization 3 (three) times a day Indications: as directed  (Patient not taking: Reported on 9/28/2022), Disp: , Rfl:   •  albuterol (PROVENTIL HFA,VENTOLIN HFA) 90 mcg/act inhaler, Inhale 2 puffs 2 (two) times a day  (Patient not taking: Reported on 9/28/2022), Disp: , Rfl:   •  ALPRAZolam (XANAX) 1 mg tablet, Take 1 mg by mouth daily  (Patient not taking: Reported on 9/28/2022), Disp: , Rfl:   •  aspirin (ECOTRIN LOW STRENGTH) 81 mg EC tablet, Take 81 mg by mouth daily  , Disp: , Rfl:   •  atenolol (TENORMIN) 50 mg tablet, Take 50 mg by mouth daily  (Patient not taking: Reported on 9/28/2022), Disp: , Rfl:   •  benztropine (COGENTIN) 1 mg tablet, Take 1 mg by mouth daily  (Patient not taking: Reported on 9/28/2022), Disp: , Rfl:   •  budesonide-formoterol (SYMBICORT) 160-4 5 mcg/act inhaler, Inhale 2 puffs 2 (two) times a day  (Patient not taking: Reported on 9/28/2022), Disp: , Rfl:   •  citalopram (CeleXA) 10 mg tablet, Take 10 mg by mouth daily, Disp: , Rfl:   •  doxazosin (CARDURA) 4 mg tablet, Take 4 mg by mouth daily at bedtime  (Patient not taking: Reported on 9/28/2022), Disp: , Rfl:   •  doxepin (SINEquan) 50 mg capsule, Take 50 mg by mouth daily at bedtime, Disp: , Rfl:   •  Exenatide ER 2 MG PEN, Inject 2 mg under the skin once a week  (Patient not taking: Reported on 9/28/2022), Disp: , Rfl:   •  FLUoxetine (PROzac) 40 MG capsule, Take 40 mg by mouth daily   (Patient not taking: Reported on 9/28/2022), Disp: , Rfl:   •  fluticasone (FLONASE) 50 mcg/act nasal spray, 1 spray into each nostril 2 (two) times a day Indications: each nostril  (Patient not taking: Reported on 9/28/2022), Disp: , Rfl:   •  furosemide (LASIX) 40 mg tablet, Take 40 mg by mouth 2 (two) times a day , Disp: , Rfl:   •  glimepiride (AMARYL) 2 mg tablet, Take 2 mg by mouth 2 (two) times a day  (Patient not taking: Reported on 9/28/2022), Disp: , Rfl:   •  haloperidol (HALDOL) 10 mg tablet, Take 10 mg by mouth daily  (Patient not taking: Reported on 9/28/2022), Disp: , Rfl:   •  insulin glargine (LANTUS) 100 units/mL subcutaneous injection, Inject 20 Units under the skin daily  (Patient not taking: Reported on 9/28/2022), Disp: , Rfl:   •  insulin lispro (HumaLOG) 100 units/mL injection, Inject 15 Units under the skin 3 (three) times a day before meals  (Patient not taking: Reported on 9/28/2022), Disp: , Rfl:   •  lidocaine (LIDODERM) 5 %, Place 1 patch on the skin every 24 hours  Remove & Discard patch within 12 hours or as directed by MD (Patient not taking: Reported on 9/28/2022), Disp: , Rfl:   •  losartan (COZAAR) 50 mg tablet, Take 50 mg by mouth 2 (two) times a day  (Patient not taking: Reported on 9/28/2022), Disp: , Rfl:   •  metFORMIN (GLUCOPHAGE) 1000 MG tablet, Take 1,000 mg by mouth 2 (two) times a day with meals, Disp: , Rfl:   •  omeprazole (PriLOSEC) 40 MG capsule, Take 40 mg by mouth 2 (two) times a day  (Patient not taking: Reported on 9/28/2022), Disp: , Rfl:   •  oxyCODONE-acetaminophen (PERCOCET) 7 5-325 MG per tablet, Take 1 tablet by mouth 3 (three) times a day   (Patient not taking: Reported on 9/28/2022), Disp: , Rfl:   •  paliperidone (INVEGA) 6 MG 24 hr tablet, Take 6 mg by mouth every morning, Disp: , Rfl:   •  sitaGLIPtin-metFORMIN (JANUMET)  MG per tablet, Take 1 tablet by mouth 2 (two) times a day , Disp: , Rfl:   •  tamsulosin (Flomax) 0 4 mg, Take 0 4 mg by mouth daily with dinner, Disp: , Rfl:   •  topiramate (TOPAMAX) 50 MG tablet, Take 50 mg by mouth every 12 (twelve) hours, Disp: , Rfl:   •  venlafaxine (EFFEXOR) 75 mg tablet, Take 75 mg by mouth 2 (two) times a day, Disp: , Rfl:   No current facility-administered medications for this visit      Past Medical History:   Diagnosis Date   • BPH (benign prostatic hyperplasia)    • Cellulitis    • CHF (congestive heart failure) (HCC)    • Chronic airway obstruction (HCC)    • Chronic back pain    • Colon polyps    • Coronary artery disease    • Degenerative, intervertebral disc    • Diabetes mellitus (HCC)    • DJD (degenerative joint disease)    • GERD (gastroesophageal reflux disease)    • Gout    • Hepatitis C, chronic (HCC)    • Hyperlipidemia    • Hypersomnia    • Hypogonadism, male    • Hypothyroidism    • Morbid obesity (HCC)    • Osteoarthritis    • Polyneuropathy    • Psychiatric disorder     paranoid schizophrenia; panic disorder; PTSD; anxiety; major depression   • Sinusitis, chronic    • Sleep apnea        Past Surgical History:   Procedure Laterality Date   • OTHER SURGICAL HISTORY Left     excision keloids ear       Social History No

## 2024-01-31 ENCOUNTER — TELEPHONE (OUTPATIENT)
Dept: UROLOGY | Facility: AMBULATORY SURGERY CENTER | Age: 59
End: 2024-01-31

## 2024-01-31 NOTE — TELEPHONE ENCOUNTER
----- Message from Clint Bailey MD sent at 1/31/2023  2:28 PM EST -----  See note regarding phone call today.  Cancel May 2023 appointment.  He will be getting his posttreatment care by that radiation oncology office  Patient knows to call to see us  1 year after treatment.

## 2024-10-08 ENCOUNTER — TELEPHONE (OUTPATIENT)
Age: 59
End: 2024-10-08

## 2024-10-08 NOTE — TELEPHONE ENCOUNTER
Radha from [facility] called for the [facility] fax#.  Radha jose pt is being seen at the center and she received some records.  Radha sts that she will be faxing over a request for additional records.  Provided Radha with office fax#